# Patient Record
Sex: FEMALE | Race: BLACK OR AFRICAN AMERICAN | NOT HISPANIC OR LATINO | URBAN - METROPOLITAN AREA
[De-identification: names, ages, dates, MRNs, and addresses within clinical notes are randomized per-mention and may not be internally consistent; named-entity substitution may affect disease eponyms.]

---

## 2018-05-02 ENCOUNTER — OFFICE (OUTPATIENT)
Dept: URBAN - METROPOLITAN AREA CLINIC 11 | Facility: CLINIC | Age: 38
End: 2018-05-02

## 2018-05-02 VITALS
HEART RATE: 57 BPM | SYSTOLIC BLOOD PRESSURE: 156 MMHG | DIASTOLIC BLOOD PRESSURE: 83 MMHG | WEIGHT: 285 LBS | HEIGHT: 67 IN

## 2018-05-02 DIAGNOSIS — I10 ESSENTIAL (PRIMARY) HYPERTENSION: ICD-10-CM

## 2018-05-02 DIAGNOSIS — E66.01 MORBID (SEVERE) OBESITY DUE TO EXCESS CALORIES: ICD-10-CM

## 2018-05-02 DIAGNOSIS — R74.8 ABNORMAL LEVELS OF OTHER SERUM ENZYMES: ICD-10-CM

## 2018-05-02 DIAGNOSIS — R14.0 ABDOMINAL DISTENSION (GASEOUS): ICD-10-CM

## 2018-05-02 DIAGNOSIS — K59.00 CONSTIPATION, UNSPECIFIED: ICD-10-CM

## 2018-05-02 LAB
ACTIN (SMOOTH MUSCLE) ANTIBODY: 11 UNITS (ref 0–19)
ALPHA-1-ANTITRYPSIN PHENOTYP: ALPHA-1-ANTITRYPSIN, SERUM: 148 MG/DL (ref 90–200)
ALPHA-1-ANTITRYPSIN PHENOTYP: PHENOTYPE (PI): (no result)
ANTINUCLEAR ANTIBODIES, IFA: NEGATIVE
CBC, PLATELET, NO DIFFERENTIAL: HEMATOCRIT: 37.7 % (ref 34–46.6)
CBC, PLATELET, NO DIFFERENTIAL: HEMOGLOBIN: 12.4 G/DL (ref 11.1–15.9)
CBC, PLATELET, NO DIFFERENTIAL: MCH: 30.1 PG (ref 26.6–33)
CBC, PLATELET, NO DIFFERENTIAL: MCHC: 32.9 G/DL (ref 31.5–35.7)
CBC, PLATELET, NO DIFFERENTIAL: MCV: 92 FL (ref 79–97)
CBC, PLATELET, NO DIFFERENTIAL: PLATELETS: 300 X10E3/UL (ref 150–379)
CBC, PLATELET, NO DIFFERENTIAL: RBC: 4.12 X10E6/UL (ref 3.77–5.28)
CBC, PLATELET, NO DIFFERENTIAL: RDW: 14.7 % (ref 12.3–15.4)
CBC, PLATELET, NO DIFFERENTIAL: WBC: 6.9 X10E3/UL (ref 3.4–10.8)
CERULOPLASMIN: 31.4 MG/DL (ref 19–39)
FE+TIBC+FER: FERRITIN, SERUM: 189 NG/ML — HIGH (ref 15–150)
FE+TIBC+FER: IRON BIND.CAP.(TIBC): 320 UG/DL (ref 250–450)
FE+TIBC+FER: IRON SATURATION: 16 % (ref 15–55)
FE+TIBC+FER: IRON: 50 UG/DL (ref 27–159)
FE+TIBC+FER: UIBC: 270 UG/DL (ref 131–425)
HBSAG SCREEN: NEGATIVE
HCV ANTIBODY: HEP C VIRUS AB: <0.1 S/CO RATIO
HEP A AB, TOTAL: NEGATIVE
HEP B CORE AB, IGM: NEGATIVE
HEP B CORE AB, TOT: NEGATIVE
HEP B SURFACE AB: HEP B SURFACE AB, QUAL: REACTIVE
HEPATIC FUNCTION PANEL (7): ALBUMIN: 4.2 G/DL (ref 3.5–5.5)
HEPATIC FUNCTION PANEL (7): ALKALINE PHOSPHATASE: 105 IU/L (ref 39–117)
HEPATIC FUNCTION PANEL (7): ALT (SGPT): 64 IU/L — HIGH (ref 0–32)
HEPATIC FUNCTION PANEL (7): AST (SGOT): 86 IU/L — HIGH (ref 0–40)
HEPATIC FUNCTION PANEL (7): BILIRUBIN, DIRECT: 0.1 MG/DL (ref 0–0.4)
HEPATIC FUNCTION PANEL (7): BILIRUBIN, TOTAL: 0.4 MG/DL (ref 0–1.2)
HEPATIC FUNCTION PANEL (7): PROTEIN, TOTAL: 8.1 G/DL (ref 6–8.5)
MITOCHONDRIAL (M2) ANTIBODY: 11.8 UNITS (ref 0–20)

## 2018-05-02 PROCEDURE — 99204 OFFICE O/P NEW MOD 45 MIN: CPT | Performed by: INTERNAL MEDICINE

## 2018-05-02 NOTE — SERVICEHPINOTES
Ms. Adler is a 38-year-old female here for evaluation of elevated liver enzymes. She states that she was recently seen by her PCP to check blood work and told her that her liver enzymes were elevated. She is uncertain how much in we have not obtained the lab reports yet. She states that she was told that she had elevated liver enzymes a couple of years ago as well. She does admit to drinking alcohol occasionally but does not usually drink on a regular basis. She states that she will usually drink a good amount of alcohol once every few months on special occasions. She has been taking Tylenol cold and sinus lately because of some sinus issues but states that she has been trying to stay within the instructions. She does have some occasional mild constipation and has some mild gas pain associated with this. She states this is better whenever she drinks water. She has no family history of liver disease, colon cancer, or colon polyps. Her mother had breast cancer. She denies any history of IV drug use or illicit drug use. She does not have any tattoos and has never had a blood transfusion. She denies any piercings outside of her ears. She denies any exposures to hepatitis that she is aware of. She does note that she has gained 5-7 pounds over the past few months. She also states that her glucose was elevated but is uncertain if she has diabetes.

## 2018-05-02 NOTE — SERVICENOTES
We will check basic labs as above including basic hepatitis labs.  We will try to also obtain the lab work from her PCP.  The elevation of liver enzymes is most likely related to fatty liver and so we will monitor this.  If the liver enzymes are significantly elevated then we can consider fibro scan or liver biopsy.  I will also check abdominal ultrasound as well. I recommended she increase water intake and take some fiber to improve her constipation.

## 2018-06-07 ENCOUNTER — OFFICE (OUTPATIENT)
Dept: URBAN - METROPOLITAN AREA CLINIC 19 | Facility: CLINIC | Age: 38
End: 2018-06-07

## 2018-06-07 DIAGNOSIS — R74.8 ABNORMAL LEVELS OF OTHER SERUM ENZYMES: ICD-10-CM

## 2018-06-07 DIAGNOSIS — R14.0 ABDOMINAL DISTENSION (GASEOUS): ICD-10-CM

## 2018-06-07 DIAGNOSIS — K59.00 CONSTIPATION, UNSPECIFIED: ICD-10-CM

## 2018-06-07 PROCEDURE — 76700 US EXAM ABDOM COMPLETE: CPT | Performed by: INTERNAL MEDICINE
